# Patient Record
Sex: MALE | URBAN - METROPOLITAN AREA
[De-identification: names, ages, dates, MRNs, and addresses within clinical notes are randomized per-mention and may not be internally consistent; named-entity substitution may affect disease eponyms.]

---

## 2021-05-13 ENCOUNTER — NURSE TRIAGE (OUTPATIENT)
Dept: NURSING | Facility: CLINIC | Age: 21
End: 2021-05-13

## 2021-05-13 NOTE — TELEPHONE ENCOUNTER
Pt states he received his first COVID vaccine through [employer], lost his vaccine card, and needs to schedule his second vaccine.  He is an employee with [employer] and believes he received the Pfizer vaccine but isn't sure.      RN gave pt the phone number for [facility] and the COVID hotline.  Advised pt to contact [facility] first and if this does not help, he will contact the COVID hotline.  He verbalized understanding and had no further questions.     Danika Longoria RN/FNA